# Patient Record
Sex: FEMALE | URBAN - METROPOLITAN AREA
[De-identification: names, ages, dates, MRNs, and addresses within clinical notes are randomized per-mention and may not be internally consistent; named-entity substitution may affect disease eponyms.]

---

## 2017-07-23 ENCOUNTER — NURSE TRIAGE (OUTPATIENT)
Dept: NURSING | Facility: CLINIC | Age: 67
End: 2017-07-23

## 2017-07-24 NOTE — TELEPHONE ENCOUNTER
Caller reports onset of dysuria and slight hematuria  This afternoon; no fever or back pain.   Reason for Disposition    [1] Painful urination AND [2] EITHER frequency or urgency AND [3] has on-call doctor    Protocols used: URINATION PAIN - FEMALE-ADULT-  Elena Taylor RN  [facility]